# Patient Record
Sex: MALE | Race: WHITE | Employment: UNEMPLOYED
[De-identification: names, ages, dates, MRNs, and addresses within clinical notes are randomized per-mention and may not be internally consistent; named-entity substitution may affect disease eponyms.]

---

## 2017-02-24 ENCOUNTER — OFFICE VISIT (OUTPATIENT)
Dept: PODIATRY | Facility: CLINIC | Age: 42
End: 2017-02-24

## 2017-02-24 VITALS
DIASTOLIC BLOOD PRESSURE: 59 MMHG | WEIGHT: 190 LBS | SYSTOLIC BLOOD PRESSURE: 93 MMHG | HEART RATE: 74 BPM | HEIGHT: 77 IN | BODY MASS INDEX: 22.43 KG/M2

## 2017-02-24 DIAGNOSIS — L98.9 BENIGN SKIN LESION: Primary | ICD-10-CM

## 2017-02-24 DIAGNOSIS — M20.41 HAMMER TOE OF RIGHT FOOT: ICD-10-CM

## 2017-02-24 DIAGNOSIS — L97.511 ULCER OF FOOT, RIGHT, LIMITED TO BREAKDOWN OF SKIN (HCC): ICD-10-CM

## 2017-02-24 DIAGNOSIS — M79.671 RIGHT FOOT PAIN: ICD-10-CM

## 2017-02-24 PROCEDURE — 11042 DBRDMT SUBQ TIS 1ST 20SQCM/<: CPT | Performed by: PODIATRIST

## 2017-02-24 PROCEDURE — 73630 X-RAY EXAM OF FOOT: CPT | Performed by: PODIATRIST

## 2017-02-24 PROCEDURE — 99203 OFFICE O/P NEW LOW 30 MIN: CPT | Performed by: PODIATRIST

## 2017-02-24 ASSESSMENT — ENCOUNTER SYMPTOMS
BACK PAIN: 0
SHORTNESS OF BREATH: 0
COLOR CHANGE: 0
NAUSEA: 0
DIARRHEA: 0

## 2017-03-10 ENCOUNTER — OFFICE VISIT (OUTPATIENT)
Dept: PODIATRY | Facility: CLINIC | Age: 42
End: 2017-03-10

## 2017-03-10 VITALS
WEIGHT: 180 LBS | BODY MASS INDEX: 21.25 KG/M2 | SYSTOLIC BLOOD PRESSURE: 107 MMHG | DIASTOLIC BLOOD PRESSURE: 72 MMHG | HEART RATE: 69 BPM | HEIGHT: 77 IN

## 2017-03-10 DIAGNOSIS — L98.9 BENIGN SKIN LESION: Primary | ICD-10-CM

## 2017-03-10 DIAGNOSIS — M79.671 RIGHT FOOT PAIN: ICD-10-CM

## 2017-03-10 PROCEDURE — 17110 DESTRUCTION B9 LES UP TO 14: CPT | Performed by: PODIATRIST

## 2017-03-10 RX ORDER — GABAPENTIN 300 MG/1
CAPSULE ORAL
COMMUNITY
Start: 2017-01-13 | End: 2020-07-20

## 2017-03-10 ASSESSMENT — ENCOUNTER SYMPTOMS
NAUSEA: 0
DIARRHEA: 0
SHORTNESS OF BREATH: 0
COLOR CHANGE: 0
BACK PAIN: 0

## 2017-03-23 ENCOUNTER — OFFICE VISIT (OUTPATIENT)
Dept: PODIATRY | Age: 42
End: 2017-03-23
Payer: COMMERCIAL

## 2017-03-23 VITALS
HEIGHT: 77 IN | HEART RATE: 60 BPM | WEIGHT: 180 LBS | BODY MASS INDEX: 21.25 KG/M2 | SYSTOLIC BLOOD PRESSURE: 112 MMHG | DIASTOLIC BLOOD PRESSURE: 77 MMHG

## 2017-03-23 DIAGNOSIS — M79.671 RIGHT FOOT PAIN: ICD-10-CM

## 2017-03-23 DIAGNOSIS — L98.9 BENIGN SKIN LESION: Primary | ICD-10-CM

## 2017-03-23 PROCEDURE — 17110 DESTRUCTION B9 LES UP TO 14: CPT | Performed by: PODIATRIST

## 2017-03-23 ASSESSMENT — ENCOUNTER SYMPTOMS
SHORTNESS OF BREATH: 0
NAUSEA: 0
COLOR CHANGE: 0
BACK PAIN: 0
DIARRHEA: 0

## 2018-04-05 ENCOUNTER — HOSPITAL ENCOUNTER (OUTPATIENT)
Age: 43
Discharge: HOME | End: 2018-04-05
Payer: COMMERCIAL

## 2018-04-05 LAB
ALBUMIN SERPL-MCNC: 4 G/DL (ref 3.5–5.2)
ALBUMIN/GLOBULIN RATIO: NORMAL (ref 1–2.5)
ALP BLD-CCNC: 76 U/L (ref 40–129)
ALT SERPL-CCNC: 27 U/L (ref 5–41)
ANION GAP SERPL CALCULATED.3IONS-SCNC: 12 MMOL/L (ref 9–17)
AST SERPL-CCNC: 18 U/L
BILIRUB SERPL-MCNC: 0.42 MG/DL (ref 0.3–1.2)
BILIRUBIN DIRECT: 0.1 MG/DL
BILIRUBIN, INDIRECT: 0.32 MG/DL (ref 0–1)
BUN BLDV-MCNC: 10 MG/DL (ref 6–20)
BUN/CREAT BLD: ABNORMAL (ref 9–20)
CALCIUM SERPL-MCNC: 8.8 MG/DL (ref 8.6–10.4)
CHLORIDE BLD-SCNC: 102 MMOL/L (ref 98–107)
CHOLESTEROL/HDL RATIO: 3.3
CHOLESTEROL: 131 MG/DL
CO2: 24 MMOL/L (ref 20–31)
CREAT SERPL-MCNC: 0.64 MG/DL (ref 0.7–1.2)
GFR AFRICAN AMERICAN: >60 ML/MIN
GFR NON-AFRICAN AMERICAN: >60 ML/MIN
GFR SERPL CREATININE-BSD FRML MDRD: ABNORMAL ML/MIN/{1.73_M2}
GFR SERPL CREATININE-BSD FRML MDRD: ABNORMAL ML/MIN/{1.73_M2}
GLOBULIN: NORMAL G/DL (ref 1.5–3.8)
GLUCOSE BLD-MCNC: 109 MG/DL (ref 70–99)
HCT VFR BLD CALC: 47.2 % (ref 41–53)
HDLC SERPL-MCNC: 40 MG/DL
HEMOGLOBIN: 15.9 G/DL (ref 13.5–17.5)
LDL CHOLESTEROL: 66 MG/DL (ref 0–130)
MCH RBC QN AUTO: 30.4 PG (ref 26–34)
MCHC RBC AUTO-ENTMCNC: 33.7 G/DL (ref 31–37)
MCV RBC AUTO: 90.1 FL (ref 80–100)
MYOGLOBIN: 27 NG/ML (ref 28–72)
NRBC AUTOMATED: NORMAL PER 100 WBC
PDW BLD-RTO: 14.3 % (ref 11.5–14.9)
PLATELET # BLD: 209 K/UL (ref 150–450)
PMV BLD AUTO: 10.2 FL (ref 6–12)
POTASSIUM SERPL-SCNC: 4.2 MMOL/L (ref 3.7–5.3)
RBC # BLD: 5.23 M/UL (ref 4.5–5.9)
SODIUM BLD-SCNC: 138 MMOL/L (ref 135–144)
TOTAL CK: 159 U/L (ref 39–308)
TOTAL PROTEIN: 7.4 G/DL (ref 6.4–8.3)
TRIGL SERPL-MCNC: 125 MG/DL
TROPONIN INTERP: NORMAL
TROPONIN T: <0.03 NG/ML
TSH SERPL DL<=0.05 MIU/L-ACNC: 0.75 MIU/L (ref 0.3–5)
VLDLC SERPL CALC-MCNC: ABNORMAL MG/DL (ref 1–30)
WBC # BLD: 7.4 K/UL (ref 3.5–11)

## 2018-04-05 PROCEDURE — 80061 LIPID PANEL: CPT

## 2018-04-05 PROCEDURE — 80048 BASIC METABOLIC PNL TOTAL CA: CPT

## 2018-04-05 PROCEDURE — 82550 ASSAY OF CK (CPK): CPT

## 2018-04-05 PROCEDURE — 84484 ASSAY OF TROPONIN QUANT: CPT

## 2018-04-05 PROCEDURE — 36415 COLL VENOUS BLD VENIPUNCTURE: CPT

## 2018-04-05 PROCEDURE — 82172 ASSAY OF APOLIPOPROTEIN: CPT

## 2018-04-05 PROCEDURE — 84443 ASSAY THYROID STIM HORMONE: CPT

## 2018-04-05 PROCEDURE — 85027 COMPLETE CBC AUTOMATED: CPT

## 2018-04-05 PROCEDURE — 83874 ASSAY OF MYOGLOBIN: CPT

## 2018-04-05 PROCEDURE — 80076 HEPATIC FUNCTION PANEL: CPT

## 2018-04-07 LAB — APOLIPOPROTEIN B: 49 MG/DL (ref 55–140)

## 2020-07-20 ENCOUNTER — HOSPITAL ENCOUNTER (EMERGENCY)
Age: 45
Discharge: HOME | End: 2020-07-20
Attending: EMERGENCY MEDICINE
Payer: COMMERCIAL

## 2020-07-20 VITALS
BODY MASS INDEX: 24.79 KG/M2 | WEIGHT: 210 LBS | OXYGEN SATURATION: 98 % | DIASTOLIC BLOOD PRESSURE: 83 MMHG | HEIGHT: 77 IN | HEART RATE: 71 BPM | SYSTOLIC BLOOD PRESSURE: 110 MMHG | TEMPERATURE: 99 F | RESPIRATION RATE: 16 BRPM

## 2020-07-20 PROCEDURE — 6360000002 HC RX W HCPCS: Performed by: EMERGENCY MEDICINE

## 2020-07-20 PROCEDURE — 99282 EMERGENCY DEPT VISIT SF MDM: CPT

## 2020-07-20 PROCEDURE — 96372 THER/PROPH/DIAG INJ SC/IM: CPT

## 2020-07-20 RX ORDER — CEFTRIAXONE 1 G/1
1 INJECTION, POWDER, FOR SOLUTION INTRAMUSCULAR; INTRAVENOUS ONCE
Status: COMPLETED | OUTPATIENT
Start: 2020-07-20 | End: 2020-07-20

## 2020-07-20 RX ORDER — SULFAMETHOXAZOLE AND TRIMETHOPRIM 800; 160 MG/1; MG/1
1 TABLET ORAL 2 TIMES DAILY
Qty: 20 TABLET | Refills: 0 | Status: SHIPPED | OUTPATIENT
Start: 2020-07-20 | End: 2020-07-30

## 2020-07-20 RX ORDER — CEPHALEXIN 500 MG/1
500 CAPSULE ORAL 2 TIMES DAILY
Qty: 20 CAPSULE | Refills: 0 | Status: SHIPPED | OUTPATIENT
Start: 2020-07-20

## 2020-07-20 RX ORDER — NAPROXEN 500 MG/1
500 TABLET ORAL 2 TIMES DAILY WITH MEALS
COMMUNITY

## 2020-07-20 RX ADMIN — CEFTRIAXONE SODIUM 1 G: 1 INJECTION, POWDER, FOR SOLUTION INTRAMUSCULAR; INTRAVENOUS at 12:26

## 2020-07-20 ASSESSMENT — PAIN DESCRIPTION - LOCATION: LOCATION: LEG

## 2020-07-20 ASSESSMENT — PAIN DESCRIPTION - DESCRIPTORS: DESCRIPTORS: ACHING

## 2020-07-20 ASSESSMENT — PAIN DESCRIPTION - PAIN TYPE: TYPE: ACUTE PAIN

## 2020-07-20 ASSESSMENT — PAIN SCALES - GENERAL: PAINLEVEL_OUTOF10: 4

## 2020-07-20 ASSESSMENT — PAIN DESCRIPTION - ORIENTATION: ORIENTATION: LEFT;RIGHT

## 2020-07-20 NOTE — ED PROVIDER NOTES
[id number] [facility]  [address] [facility] [address]  Phone: [phone]  Fax: [phone]    Pt Name: Stormy Thomas  MRN: [id number]  [place] 1975  Date of evaluation: 7/20/2020      CHIEF COMPLAINT       Chief Complaint   Patient presents with    Leg Problem         HISTORY 2016 [facility] Osmany Chiu is a 40 y.o. male who presents with redness and rash of both lower extremities in the posterior surface. The areas have been draining. Symptoms started 2 weeks ago and appeared to be worsening. He saw his physician and was laced on antibiotic cream which is not helping. He has no systemic symptoms. He has no fever and vital signs are normal at this time. Brought in by his relative. REVIEW OF SYSTEMS       Other ROS negative except as noted above. PAST MEDICAL HISTORY    has a past medical history of Back pain. SURGICAL HISTORY      has no past surgical history on file. CURRENT MEDICATIONS       Previous Medications    METHOCARBAMOL (ROBAXIN-750 PO)    Take by mouth    NAPROXEN (NAPROSYN) 500 MG TABLET    Take 500 mg by mouth 2 times daily (with meals)       ALLERGIES     has No Known Allergies. FAMILY HISTORY     has no family status information on file. family history is not on file. SOCIAL HISTORY      reports that he has been smoking cigarettes. He has a 20.00 pack-year smoking history. He does not have any smokeless tobacco history on file. He reports current drug use. Drug: Marijuana. He reports that he does not drink alcohol. PHYSICAL EXAM     INITIAL VITALS:  height is 6' 5\" (1.956 m) and weight is 95.3 kg (210 lb). His oral temperature is 99 °F (37.2 °C). His blood pressure is 110/83 and his pulse is 71. His respiration is 16 and oxygen saturation is 98%. Constitutional: The patient is alert, well-developed, in no acute distress. Vital signs as noted.    Skin: There is area of about 5 cm with some central clearing of the posterior surface of both lower extremities. There is no lymphangitis no abscess and good range of motion of both lower legs. DIFFERENTIAL DIAGNOSIS/ MEDICAL DECISION MAKING:     No sepsis or systemic illness. Antibiotics as directed    Follow Exit Care instructions closely. I have reviewed the disposition diagnosis with the patient and or their family/guardian. I have answered their questions and given discharge instructions. They voiced understanding of these instructions and did not have any further questions or complaints. DIAGNOSTIC RESULTS     RADIOLOGY:   Non-plain film images such as CT, Ultrasound and MRI are read by the radiologist. Plain radiographic images are visualized and preliminarily interpreted by the emergency physician with the below findings:  No orders to display         LABS:  No results found for this visit on 07/20/20. ABNORMAL LABS:  Labs Reviewed - No data to display           EMERGENCY DEPARTMENT COURSE:   Vitals:    Vitals:    07/20/20 1201   BP: 110/83   Pulse: 71   Resp: 16   Temp: 99 °F (37.2 °C)   TempSrc: Oral   SpO2: 98%   Weight: 95.3 kg (210 lb)   Height: 6' 5\" (1.956 m)     -------------------------  BP: 110/83, Temp: 99 °F (37.2 °C), Pulse: 71, Resp: 16    See DDX/MDM  (Differential Diagnosis/Medical Decision Making) above. FINAL IMPRESSION      1.  Cellulitis of lower leg          DISPOSITION/PLAN   DISPOSITION Decision To Discharge 07/20/2020 12:18:05 PM      Condition on Disposition    Fair    PATIENT REFERRED TO:  Giancarlo Headley MD  [address] [address] [phone]    In 2 days        DISCHARGE MEDICATIONS:  New Prescriptions    CEPHALEXIN (KEFLEX) 500 MG CAPSULE    Take 1 capsule by mouth 2 times daily    SULFAMETHOXAZOLE-TRIMETHOPRIM (BACTRIM DS) 800-160 MG PER TABLET    Take 1 tablet by mouth 2 times daily for 10 days       (Please note that portions of this note were completed with a voice recognition program.  Efforts were made to edit the dictations but occasionally words are mis-transcribed.)    Patrick Talbot,, DO  Attending Emergency Physician       [address] [facility], [place]  07/20/20 2820

## 2020-07-20 NOTE — ED NOTES
Pt ambulated to room 2. C/o sores to bilat legs. Pt reports he has been intermittently getting these sores for months. Pt reports he was placed on an antibiotic by his PCP and it helped other sores but then they return after a couple weeks. Pt sts these wounds to his legs have been there x 1 week. Pt denies any fevers/chills. Pt noted to have wound to left posterior calf, black center, and drg noted from wound, purulent drg mixed with blood. Pt also noted to have raised area to rt lower lateral leg, no drg noted at this time. Pt noted to have redness around areas and tender to touch.       Claudio Gray RN  07/20/20 5622

## 2020-07-20 NOTE — ED NOTES
No s/s reaction noted at injection site. Patient cleared for discharge. Patient discharge instructions explained, Rx given and explained to patient. Patient verbalized understanding of all instructions and all patient questions answered to their satisfaction. Patient departs in stable condition.         Feliciano Mclean RN  07/20/20 5471

## 2023-03-01 ENCOUNTER — HOSPITAL ENCOUNTER (OUTPATIENT)
Dept: MRI IMAGING | Age: 48
Discharge: HOME | End: 2023-03-03
Payer: COMMERCIAL

## 2023-03-01 DIAGNOSIS — M54.40 LOW BACK PAIN WITH SCIATICA, SCIATICA LATERALITY UNSPECIFIED, UNSPECIFIED BACK PAIN LATERALITY, UNSPECIFIED CHRONICITY: ICD-10-CM

## 2023-03-01 PROCEDURE — 72148 MRI LUMBAR SPINE W/O DYE: CPT
